# Patient Record
Sex: MALE | Race: ASIAN | NOT HISPANIC OR LATINO
[De-identification: names, ages, dates, MRNs, and addresses within clinical notes are randomized per-mention and may not be internally consistent; named-entity substitution may affect disease eponyms.]

---

## 2023-11-16 PROBLEM — Z00.00 ENCOUNTER FOR PREVENTIVE HEALTH EXAMINATION: Status: ACTIVE | Noted: 2023-11-16

## 2023-11-20 ENCOUNTER — APPOINTMENT (OUTPATIENT)
Dept: CARDIOLOGY | Facility: CLINIC | Age: 53
End: 2023-11-20
Payer: MEDICAID

## 2023-11-20 VITALS
TEMPERATURE: 97 F | WEIGHT: 183 LBS | RESPIRATION RATE: 18 BRPM | BODY MASS INDEX: 32.43 KG/M2 | DIASTOLIC BLOOD PRESSURE: 80 MMHG | HEIGHT: 63 IN | HEART RATE: 91 BPM | SYSTOLIC BLOOD PRESSURE: 143 MMHG | OXYGEN SATURATION: 93 %

## 2023-11-20 DIAGNOSIS — R06.02 SHORTNESS OF BREATH: ICD-10-CM

## 2023-11-20 DIAGNOSIS — Z82.49 FAMILY HISTORY OF ISCHEMIC HEART DISEASE AND OTHER DISEASES OF THE CIRCULATORY SYSTEM: ICD-10-CM

## 2023-11-20 DIAGNOSIS — I10 ESSENTIAL (PRIMARY) HYPERTENSION: ICD-10-CM

## 2023-11-20 DIAGNOSIS — R07.89 OTHER CHEST PAIN: ICD-10-CM

## 2023-11-20 DIAGNOSIS — Z86.39 PERSONAL HISTORY OF OTHER ENDOCRINE, NUTRITIONAL AND METABOLIC DISEASE: ICD-10-CM

## 2023-11-20 PROCEDURE — 93306 TTE W/DOPPLER COMPLETE: CPT

## 2023-11-20 PROCEDURE — 99204 OFFICE O/P NEW MOD 45 MIN: CPT | Mod: 25

## 2023-11-20 PROCEDURE — 93015 CV STRESS TEST SUPVJ I&R: CPT

## 2024-04-15 ENCOUNTER — APPOINTMENT (OUTPATIENT)
Dept: PULMONOLOGY | Facility: CLINIC | Age: 54
End: 2024-04-15
Payer: MEDICAID

## 2024-04-15 VITALS
OXYGEN SATURATION: 95 % | DIASTOLIC BLOOD PRESSURE: 72 MMHG | HEART RATE: 101 BPM | BODY MASS INDEX: 34.01 KG/M2 | SYSTOLIC BLOOD PRESSURE: 100 MMHG | WEIGHT: 192 LBS | TEMPERATURE: 96.9 F

## 2024-04-15 DIAGNOSIS — R06.83 SNORING: ICD-10-CM

## 2024-04-15 DIAGNOSIS — R05.3 CHRONIC COUGH: ICD-10-CM

## 2024-04-15 DIAGNOSIS — Z91.89 OTHER SPECIFIED PERSONAL RISK FACTORS, NOT ELSEWHERE CLASSIFIED: ICD-10-CM

## 2024-04-15 PROCEDURE — 99204 OFFICE O/P NEW MOD 45 MIN: CPT

## 2024-04-15 RX ORDER — PREDNISONE 20 MG/1
20 TABLET ORAL DAILY
Qty: 10 | Refills: 0 | Status: ACTIVE | COMMUNITY
Start: 2024-04-15 | End: 1900-01-01

## 2024-04-15 RX ORDER — AMLODIPINE BESYLATE 5 MG/1
TABLET ORAL
Refills: 0 | Status: ACTIVE | COMMUNITY

## 2024-04-15 RX ORDER — ATORVASTATIN CALCIUM 80 MG/1
TABLET, FILM COATED ORAL
Refills: 0 | Status: ACTIVE | COMMUNITY

## 2024-04-15 RX ORDER — LOSARTAN POTASSIUM 100 MG/1
TABLET, FILM COATED ORAL
Refills: 0 | Status: ACTIVE | COMMUNITY

## 2024-04-15 NOTE — REVIEW OF SYSTEMS
[Cough] : cough [Sputum] : sputum [Headache] : headache [Fever] : no fever [Chills] : no chills [Eye Irritation] : no eye irritation [Sinus Problems] : no sinus problems [Hemoptysis] : no hemoptysis [Chest Tightness] : no chest tightness [Dyspnea] : no dyspnea [Wheezing] : no wheezing [Edema] : no edema [Palpitations] : no palpitations [Hay Fever] : no hay fever [GERD] : no gerd [Myalgias] : no myalgias [Back Pain] : no back pain [Rash] : no rash [History of Iron Deficiency] : no history of iron deficiency [Anxiety] : no anxiety [Diabetes] : no diabetes [Thyroid Problem] : no thyroid problem

## 2024-04-15 NOTE — DISCUSSION/SUMMARY
[FreeTextEntry1] : He is a 53-year-old man with a history hypertension, hyperlipidemia and elevated BMI.  Impression  Cough Snoring -With symptoms consistent with sleep disordered breathing  Recommend Chest radiograph Prednisone 20 mg a day for 10 days Home sleep apnea test Further recommendations to follow

## 2024-04-15 NOTE — PHYSICAL EXAM
[No Acute Distress] : no acute distress [IV] : Mallampati Class: IV [Normal Appearance] : normal appearance [No Neck Mass] : no neck mass [Normal Rate/Rhythm] : normal rate/rhythm [Normal S1, S2] : normal s1, s2 [No Resp Distress] : no resp distress [Clear to Auscultation Bilaterally] : clear to auscultation bilaterally [No HSM] : no hsm [Normal Gait] : normal gait [No Clubbing] : no clubbing [No Cyanosis] : no cyanosis [No Edema] : no edema [Normal Color/ Pigmentation] : normal color/ pigmentation [No Focal Deficits] : no focal deficits [Oriented x3] : oriented x3 [Normal Affect] : normal affect

## 2024-04-15 NOTE — HISTORY OF PRESENT ILLNESS
[Never] : never [Awakes with Dry Mouth] : awakes with dry mouth [Awakes with Headache] : awakes with headache [Fatigue] : fatigue [Nonrestorative Sleep] : nonrestorative sleep [Recent  Weight Gain] : recent weight gain [Snoring] : snoring [Witnessed Apneas] : witnessed apneas [TextBox_4] : 53-year-old man who has been complaining of a cough for several weeks.  The cough is productive of yellow phlegm.  It is paroxysmal at times.  No shortness of breath or wheezing.  No fever or chills.  No history of COVID.  He also has been noted to snore excessively.  He wakes up gasping for air.  He also wakes up with a headache and a dry mouth.  He has never been evaluated for sleep disordered breathing.  He denies any history of pulmonary disease.  He did have asthma as a child.

## 2025-03-19 ENCOUNTER — APPOINTMENT (OUTPATIENT)
Dept: PULMONOLOGY | Facility: CLINIC | Age: 55
End: 2025-03-19

## 2025-03-24 ENCOUNTER — APPOINTMENT (OUTPATIENT)
Dept: PULMONOLOGY | Facility: CLINIC | Age: 55
End: 2025-03-24
Payer: MEDICAID

## 2025-03-24 ENCOUNTER — NON-APPOINTMENT (OUTPATIENT)
Age: 55
End: 2025-03-24

## 2025-03-24 VITALS
SYSTOLIC BLOOD PRESSURE: 124 MMHG | BODY MASS INDEX: 34.54 KG/M2 | DIASTOLIC BLOOD PRESSURE: 74 MMHG | OXYGEN SATURATION: 94 % | HEART RATE: 103 BPM | TEMPERATURE: 97.3 F | WEIGHT: 195 LBS

## 2025-03-24 DIAGNOSIS — Z91.89 OTHER SPECIFIED PERSONAL RISK FACTORS, NOT ELSEWHERE CLASSIFIED: ICD-10-CM

## 2025-03-24 DIAGNOSIS — R06.83 SNORING: ICD-10-CM

## 2025-03-24 PROCEDURE — 99213 OFFICE O/P EST LOW 20 MIN: CPT

## 2025-05-29 ENCOUNTER — APPOINTMENT (OUTPATIENT)
Dept: CARDIOLOGY | Facility: CLINIC | Age: 55
End: 2025-05-29
Payer: MEDICAID

## 2025-05-29 VITALS
DIASTOLIC BLOOD PRESSURE: 78 MMHG | HEART RATE: 87 BPM | WEIGHT: 190 LBS | SYSTOLIC BLOOD PRESSURE: 121 MMHG | OXYGEN SATURATION: 94 % | BODY MASS INDEX: 33.66 KG/M2 | RESPIRATION RATE: 18 BRPM

## 2025-05-29 DIAGNOSIS — I10 ESSENTIAL (PRIMARY) HYPERTENSION: ICD-10-CM

## 2025-05-29 DIAGNOSIS — R06.02 SHORTNESS OF BREATH: ICD-10-CM

## 2025-05-29 DIAGNOSIS — R07.89 OTHER CHEST PAIN: ICD-10-CM

## 2025-05-29 DIAGNOSIS — Z82.49 FAMILY HISTORY OF ISCHEMIC HEART DISEASE AND OTHER DISEASES OF THE CIRCULATORY SYSTEM: ICD-10-CM

## 2025-05-29 DIAGNOSIS — Z86.39 PERSONAL HISTORY OF OTHER ENDOCRINE, NUTRITIONAL AND METABOLIC DISEASE: ICD-10-CM

## 2025-05-29 PROCEDURE — 99214 OFFICE O/P EST MOD 30 MIN: CPT

## 2025-05-29 PROCEDURE — G2211 COMPLEX E/M VISIT ADD ON: CPT | Mod: NC
